# Patient Record
Sex: FEMALE | Race: OTHER | Employment: OTHER | URBAN - METROPOLITAN AREA
[De-identification: names, ages, dates, MRNs, and addresses within clinical notes are randomized per-mention and may not be internally consistent; named-entity substitution may affect disease eponyms.]

---

## 2019-06-20 ENCOUNTER — PREPPED CHART (OUTPATIENT)
Dept: URBAN - METROPOLITAN AREA CLINIC 46 | Facility: CLINIC | Age: 54
End: 2019-06-20

## 2019-06-20 ENCOUNTER — ESTABLISHED COMPREHENSIVE EXAM (OUTPATIENT)
Dept: URBAN - METROPOLITAN AREA CLINIC 46 | Facility: CLINIC | Age: 54
End: 2019-06-20

## 2019-06-20 DIAGNOSIS — Z01.00: ICD-10-CM

## 2019-06-20 PROCEDURE — 92014 COMPRE OPH EXAM EST PT 1/>: CPT

## 2019-06-20 PROCEDURE — 92015 DETERMINE REFRACTIVE STATE: CPT

## 2019-06-20 ASSESSMENT — TONOMETRY
OD_IOP_MMHG: 17
OS_IOP_MMHG: 20

## 2019-06-20 ASSESSMENT — VISUAL ACUITY
OS_OTHER: N//+50
OS_CC: J3
OS_CC: 20/30-2
OD_SC: 20/60-2
OS_SC: 20/70
OD_SC: J10
OD_CC: 20/25-2
OD_CC: J3
OS_SC: J10

## 2020-08-05 NOTE — PATIENT DISCUSSION
Discussed condition and exacerbating conditions/situations (e.g., dry/arid environments, overhead fans, air conditioners, side effect of medications). Quality 226: Preventive Care And Screening: Tobacco Use: Screening And Cessation Intervention: Patient screened for tobacco and is an ex-smoker Detail Level: Detailed Quality 131: Pain Assessment And Follow-Up: Pain assessment using a standardized tool is documented as negative, no follow-up plan required

## 2023-05-10 ENCOUNTER — NEW PATIENT (OUTPATIENT)
Dept: URBAN - METROPOLITAN AREA CLINIC 46 | Facility: CLINIC | Age: 58
End: 2023-05-10

## 2023-05-10 DIAGNOSIS — H04.123: ICD-10-CM

## 2023-05-10 DIAGNOSIS — H01.021: ICD-10-CM

## 2023-05-10 DIAGNOSIS — Z01.00: ICD-10-CM

## 2023-05-10 DIAGNOSIS — H01.024: ICD-10-CM

## 2023-05-10 DIAGNOSIS — H25.813: ICD-10-CM

## 2023-05-10 PROCEDURE — 92015 DETERMINE REFRACTIVE STATE: CPT

## 2023-05-10 PROCEDURE — 92004 COMPRE OPH EXAM NEW PT 1/>: CPT

## 2023-05-10 ASSESSMENT — VISUAL ACUITY
OD_SC: J10
OD_PH: 20/25-1
OS_CC: J6
OD_CC: J10
OS_SC: J10
OS_SC: 20/60+1
OS_PH: 20/30+1
OS_CC: 20/30-2
OD_BAT: 20/30
OS_BAT: 20/50
OD_SC: 20/60
OD_CC: 20/25

## 2023-05-10 ASSESSMENT — TONOMETRY
OD_IOP_MMHG: 17
OS_IOP_MMHG: 16